# Patient Record
Sex: MALE | Race: BLACK OR AFRICAN AMERICAN | NOT HISPANIC OR LATINO | URBAN - METROPOLITAN AREA
[De-identification: names, ages, dates, MRNs, and addresses within clinical notes are randomized per-mention and may not be internally consistent; named-entity substitution may affect disease eponyms.]

---

## 2017-06-06 ENCOUNTER — EMERGENCY (EMERGENCY)
Facility: HOSPITAL | Age: 32
LOS: 1 days | Discharge: HOME | End: 2017-06-06
Admitting: EMERGENCY MEDICINE
Payer: COMMERCIAL

## 2017-06-06 VITALS
TEMPERATURE: 98 F | RESPIRATION RATE: 16 BRPM | OXYGEN SATURATION: 98 % | HEART RATE: 66 BPM | SYSTOLIC BLOOD PRESSURE: 125 MMHG | DIASTOLIC BLOOD PRESSURE: 84 MMHG

## 2017-06-06 PROCEDURE — 99284 EMERGENCY DEPT VISIT MOD MDM: CPT

## 2017-06-06 RX ORDER — KETOROLAC TROMETHAMINE 30 MG/ML
30 SYRINGE (ML) INJECTION ONCE
Qty: 0 | Refills: 0 | Status: DISCONTINUED | OUTPATIENT
Start: 2017-06-06 | End: 2017-06-06

## 2017-06-06 RX ADMIN — Medication 30 MILLIGRAM(S): at 22:45
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
## 2017-06-06 NOTE — ED PROVIDER NOTE - ENMT, MLM
Airway patent, Nasal mucosa clear. Mouth with normal mucosa. Throat has no vesicles, no oropharyngeal exudates and uvula is midline. Ears TMs astorga b/l, no gross blood noted.
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
## 2019-03-04 PROBLEM — Z00.00 ENCOUNTER FOR PREVENTIVE HEALTH EXAMINATION: Status: ACTIVE | Noted: 2019-03-04

## 2019-03-14 ENCOUNTER — APPOINTMENT (OUTPATIENT)
Dept: SPINE | Facility: CLINIC | Age: 34
End: 2019-03-14
Payer: COMMERCIAL

## 2019-03-14 VITALS
SYSTOLIC BLOOD PRESSURE: 120 MMHG | BODY MASS INDEX: 26.51 KG/M2 | WEIGHT: 200 LBS | HEIGHT: 73 IN | DIASTOLIC BLOOD PRESSURE: 70 MMHG

## 2019-03-14 DIAGNOSIS — M54.2 CERVICALGIA: ICD-10-CM
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
## 2019-03-14 DIAGNOSIS — M54.5 LOW BACK PAIN: ICD-10-CM

## 2019-03-14 DIAGNOSIS — Z78.9 OTHER SPECIFIED HEALTH STATUS: ICD-10-CM

## 2019-03-14 PROCEDURE — 99203 OFFICE O/P NEW LOW 30 MIN: CPT

## 2019-03-14 RX ORDER — IBUPROFEN 200 MG/1
TABLET, COATED ORAL
Refills: 0 | Status: ACTIVE | COMMUNITY

## 2019-03-14 RX ORDER — MULTIVITAMIN
TABLET ORAL
Refills: 0 | Status: ACTIVE | COMMUNITY

## 2019-03-14 RX ORDER — IBUPROFEN 800 MG/1
TABLET, FILM COATED ORAL
Refills: 0 | Status: ACTIVE | COMMUNITY
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
## 2019-03-14 NOTE — PHYSICAL EXAM
[Person] : oriented to person [Place] : oriented to place [Time] : oriented to time [Short Term Intact] : short term memory intact [Remote Intact] : remote memory intact [Span Intact] : the attention span was normal [Concentration Intact] : normal concentrating ability [Fluency] : fluency intact [Comprehension] : comprehension intact [Current Events] : adequate knowledge of current events [Past History] : adequate knowledge of personal past history [Vocabulary] : adequate range of vocabulary [Cranial Nerves Optic (II)] : visual acuity intact bilaterally,  pupils equal round and reactive to light [Cranial Nerves Oculomotor (III)] : extraocular motion intact [Cranial Nerves Trigeminal (V)] : facial sensation intact symmetrically [Cranial Nerves Facial (VII)] : face symmetrical [Cranial Nerves Vestibulocochlear (VIII)] : hearing was intact bilaterally [Cranial Nerves Glossopharyngeal (IX)] : tongue and palate midline [Cranial Nerves Accessory (XI - Cranial And Spinal)] : head turning and shoulder shrug symmetric [Cranial Nerves Hypoglossal (XII)] : there was no tongue deviation with protrusion [Motor Tone] : muscle tone was normal in all four extremities [Motor Strength] : muscle strength was normal in all four extremities [No Muscle Atrophy] : normal bulk in all four extremities [Sensation Tactile Decrease] : light touch was intact [Abnormal Walk] : normal gait [Balance] : balance was intact [Past-pointing] : there was no past-pointing [Tremor] : no tremor present [2+] : Ankle jerk left 2+ [Plantar Reflex Right Only] : normal on the right [Plantar Reflex Left Only] : normal on the left [Gibson] : Gibson's sign was not demonstrated [L'Hermitte's] : neck flexion did not produce tingling down the spine/arms [Spurling's - Opposite Side] : Negative Spurling's on opposite side [Spurling's Same Side] : Negative Spurling's on same side [No Tenderness to Palpation] : no spine tenderness on palpation [Straight-Leg Raise Test - Left] : straight leg raise of the left leg was negative [Straight-Leg Raise Test - Right] : straight leg raise  of the right leg was negative

## 2024-10-01 ENCOUNTER — APPOINTMENT (OUTPATIENT)
Dept: INTERNAL MEDICINE | Facility: CLINIC | Age: 39
End: 2024-10-01

## 2025-04-23 ENCOUNTER — NON-APPOINTMENT (OUTPATIENT)
Age: 40
End: 2025-04-23

## 2025-04-29 ENCOUNTER — TRANSCRIPTION ENCOUNTER (OUTPATIENT)
Age: 40
End: 2025-04-29

## 2025-04-29 ENCOUNTER — INPATIENT (INPATIENT)
Facility: HOSPITAL | Age: 40
LOS: 0 days | Discharge: HOME | End: 2025-04-30
Attending: UROLOGY | Admitting: UROLOGY
Payer: COMMERCIAL

## 2025-04-29 VITALS
HEART RATE: 85 BPM | OXYGEN SATURATION: 96 % | TEMPERATURE: 98 F | SYSTOLIC BLOOD PRESSURE: 130 MMHG | HEIGHT: 73 IN | WEIGHT: 229.94 LBS | RESPIRATION RATE: 19 BRPM | DIASTOLIC BLOOD PRESSURE: 80 MMHG

## 2025-04-29 LAB
HCT VFR BLD CALC: 45.2 % — SIGNIFICANT CHANGE UP (ref 39–50)
HGB BLD-MCNC: 15.2 G/DL — SIGNIFICANT CHANGE UP (ref 13–17)
MCHC RBC-ENTMCNC: 28.7 PG — SIGNIFICANT CHANGE UP (ref 27–34)
MCHC RBC-ENTMCNC: 33.6 G/DL — SIGNIFICANT CHANGE UP (ref 32–36)
MCV RBC AUTO: 85.3 FL — SIGNIFICANT CHANGE UP (ref 80–100)
NRBC BLD AUTO-RTO: 0 /100 WBCS — SIGNIFICANT CHANGE UP (ref 0–0)
PLATELET # BLD AUTO: 226 K/UL — SIGNIFICANT CHANGE UP (ref 150–400)
RBC # BLD: 5.3 M/UL — SIGNIFICANT CHANGE UP (ref 4.2–5.8)
RBC # FLD: 13.9 % — SIGNIFICANT CHANGE UP (ref 10.3–14.5)
WBC # BLD: 10.35 K/UL — SIGNIFICANT CHANGE UP (ref 3.8–10.5)
WBC # FLD AUTO: 10.35 K/UL — SIGNIFICANT CHANGE UP (ref 3.8–10.5)

## 2025-04-29 PROCEDURE — 55110 EXPLORE SCROTUM: CPT | Mod: LT

## 2025-04-29 PROCEDURE — 99285 EMERGENCY DEPT VISIT HI MDM: CPT

## 2025-04-29 PROCEDURE — 76870 US EXAM SCROTUM: CPT | Mod: 26

## 2025-04-29 RX ORDER — HEPARIN SODIUM 1000 [USP'U]/ML
5000 INJECTION INTRAVENOUS; SUBCUTANEOUS EVERY 8 HOURS
Refills: 0 | Status: DISCONTINUED | OUTPATIENT
Start: 2025-04-29 | End: 2025-04-30

## 2025-04-29 RX ORDER — SODIUM CHLORIDE 9 G/1000ML
1000 INJECTION, SOLUTION INTRAVENOUS
Refills: 0 | Status: DISCONTINUED | OUTPATIENT
Start: 2025-04-29 | End: 2025-04-30

## 2025-04-29 NOTE — ED PROVIDER NOTE - CLINICAL SUMMARY MEDICAL DECISION MAKING FREE TEXT BOX
Patient is a 40-year-old male presenting to the emergency department today for testicular pain.  Tender to palpation over the left testicle.  Concern today for torsion.  Ultrasound was taken showing concern for torsion/detorsion.  Urinalysis was ordered for the patient.  Urology was consulted for evaluation.    At this time, the patient's consult by urology has not been completed.  For this reason, they will be signed out to the oncoming physician.  Please see the oncoming physician's addendum's, notes, and progress notes for any change in this patient's management or care. Patient is a 40-year-old male presenting to the emergency department today for testicular pain.  Tender to palpation over the left testicle.  Concern today for torsion.  Ultrasound was taken showing concern for torsion/detorsion.  Urinalysis was ordered for the patient.  Urology was consulted for evaluation.    Urology did evaluate the patient.  Per the recommendation, the patient should be admitted under their service as he will require evaluation in the operating room tonight for testicular torsion.  Patient expresses understanding and is amenable to this plan.  I offered the patient medication for pain and he declined at this time.

## 2025-04-29 NOTE — ED ADULT NURSE NOTE - CAS TRG GEN SKIN COLOR
Normal for race

## 2025-04-29 NOTE — H&P ADULT - ASSESSMENT
40M w/ no pertinent PMHx presented w/ Left testicular pain, nausea, and vomiting.  US revealed decreased L testicular vascularity compared to R. Torsion-detorsion on differential.    Plan as discussed with Dr. Medina:  - admit  - pre-op labs, EKG, IVF, NPO  - consent and book for OR for scrotal exploration, possible left orchiectomy, right orchiopexy

## 2025-04-29 NOTE — ED PROVIDER NOTE - OBJECTIVE STATEMENT
This patient is a 40-year-old male presenting to the emergency department today with testicular pain.  He has no relevant past medical history.  States that 2 days ago he started experiencing pain in his left testicle after abruptly jumping.  States the pain continued to worsen through the day.  For this reason he came to the emergency department for further evaluation.  He has no chest pain or shortness of breath.  No dysuria.  No blood at the urethral meatus.  No other complaints at this time

## 2025-04-29 NOTE — ED PROVIDER NOTE - PHYSICAL EXAMINATION
GENERAL: The patient appears well and is in no apparent distress.    EYES: Pupils equal and reactive.  Extraocular eye movements are intact.    ENT: Head is atraumatic.  Posterior oropharynx is unremarkable.      ABDOMEN: Abdomen is soft, nondistended, and non-peritoneal.  Bowel sounds are appreciated in all 4 quadrants.  The patient has no focal areas of tenderness.    Genitourinary: Right testicle soft and normal lie.  Left testicle noted to be elevated.  Tender to palpation.  Penile shaft is normal.  No evidence of ulcerations.  Penile head shows no discharge.  No blood at the urethral meatus.    SKIN: Skin is intact without evidence of significant lacerations or sores.    MUSCULOSKELETAL: Patient has good range of motion of all extremities.  The patient has good distal cap refill.  The patient has palpable distal pulses.  No obvious edema is noted.    NEUROLOGIC: Cranial nerves II through XII are grossly within normal limits.  Sensory and motor examination is unremarkable.    PSYCHIATRIC: Patient is awake, alert, and oriented ×4.

## 2025-04-29 NOTE — ED ADULT NURSE NOTE - TEMPLATE
INR 2.13 today   Is on 6 mg daily   Noted last couma clinic note in EPIC 4/27 was getting 45 mg weekly dose using 5 mg/7.5 mg alternating.     New order  Cont 6 mg po q day and recheck inr Monday 5.7    Symptoms
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
## 2025-04-29 NOTE — ED PROVIDER NOTE - NS ED ROS FT
CONSTITUTIONAL: No weight loss, fever, chills, weakness or fatigue.    HEENT:    Eyes: No visual loss, blurred vision, double vision or yellow sclerae.  Ears, Nose, Throat: No hearing loss, sneezing, congestion, runny nose or sore throat.    CARDIOVASCULAR: No chest pain, chest pressure or chest discomfort. No palpitations or edema.    RESPIRATORY: No shortness of breath, cough or sputum.    GASTROINTESTINAL: No anorexia, nausea, vomiting or diarrhea. No abdominal pain or blood.    SKIN: No rash or itching.    GENITOURINARY: Positive for testicular pain    NEUROLOGICAL: No headache, dizziness, syncope, paralysis, ataxia, numbness or tingling in the extremities. No change in bowel or bladder control.    MUSCULOSKELETAL: No muscle, back pain, joint pain or stiffness.

## 2025-04-29 NOTE — H&P ADULT - NSHPPHYSICALEXAM_GEN_ALL_CORE
T(C): 36.9 (04-29-25 @ 21:22), Max: 36.9 (04-29-25 @ 21:22)  HR: 85 (04-29-25 @ 21:22) (85 - 85)  BP: 130/80 (04-29-25 @ 21:22) (130/80 - 130/80)  RR: 19 (04-29-25 @ 21:22) (19 - 19)  SpO2: 96% (04-29-25 @ 21:22) (96% - 96%)    CONSTITUTIONAL: Well groomed, no apparent distress    EYES:  symmetric, EOMI, No conjunctival or scleral injection, non-icteric    ENMT: Oral mucosa with moist membranes. NCAT    RESPIRATORY: No respiratory distress, no use of accessory muscles    CARDIOVASCULAR: RRR    GASTROINTESTINAL: Soft, non tender, non distended, no rebound, no guarding    : TTP of Left testicle, nontender R testicle    MUSCULOSKELETAL: Normal gait and station; no digital clubbing or cyanosis    SKIN: No rashes or ulcers noted    NEUROLOGIC:  A+O x 3    PSYCHIATRIC: Appropriate insight/judgment;, mood and affect appropriate

## 2025-04-29 NOTE — H&P ADULT - NSHPLABSRESULTS_GEN_ALL_CORE
< from: US Testicles (04.29.25 @ 22:36) >      ACC: [id number] EXAM:  US SCROTUM AND CONTENTS   ORDERED BY: TIM SCHWARTZ     PROCEDURE DATE:  04/29/2025          INTERPRETATION:  CLINICAL INFORMATION: Left testicular pain.    COMPARISON: None available.    TECHNIQUE: Testicular ultrasound utilizing color and spectral Doppler.    FINDINGS:    RIGHT:  Right testis: 4.7 cm x 2.1 cm x 3.2 cm. Normal echogenicity and   echotexture with no masses or areas of architectural distortion. Normal   arterial and venous blood flow pattern.  Right epididymis: Within normal limits.  Right hydrocele: None.  Right varicocele: None.    LEFT:  Left testis: 3.8 cm x 2.0 cm x 2.6 cm. Normal echogenicity and   echotexture with no masses or areas of architectural distortion. Decrease   vascularity compared to contralateral side.  Left epididymis: Within normal limits.  Left hydrocele: Trace.  Left varicocele: None.    IMPRESSION:    Decreased left testicular vascularity compared to the right side.   Possibility of torsion-detorsion considered in the differential. Urology   consultation and follow-up imaging is suggested.    Trace ascites.    These critical results were discussed via telephone at 4/29/2025 10:51 PM   by Dr. Trivedi of radiology with Dr. Schwartz.    --- End of Report ---            MARILYN WEBBER; Attending Radiologist  This document has been electronically signed. Apr 29 2025 10:52PM    < end of copied text >

## 2025-04-29 NOTE — H&P ADULT - HISTORY OF PRESENT ILLNESS
40M w/ no PMHx presented to ED w/ testicular pain since yesterday. States he stood up quickly and felt a squeezing sensation followed by weakness in legs. Attempted to alleviate pain with ice and OTC pain medications without help. Pain radiates to back and into L groin area. Reports associated nausea and vomiting. Some subjective fever. Patient has been sick recently with a cold for which he was taking Augmentin and pseudophed-Bromphen since 4/23. Denies CP, calf pain, or swelling.

## 2025-04-30 ENCOUNTER — TRANSCRIPTION ENCOUNTER (OUTPATIENT)
Age: 40
End: 2025-04-30

## 2025-04-30 ENCOUNTER — RESULT REVIEW (OUTPATIENT)
Age: 40
End: 2025-04-30

## 2025-04-30 VITALS
TEMPERATURE: 98 F | SYSTOLIC BLOOD PRESSURE: 105 MMHG | DIASTOLIC BLOOD PRESSURE: 64 MMHG | OXYGEN SATURATION: 95 % | RESPIRATION RATE: 19 BRPM | HEART RATE: 87 BPM

## 2025-04-30 LAB
ANION GAP SERPL CALC-SCNC: 1 MMOL/L — LOW (ref 5–17)
ANION GAP SERPL CALC-SCNC: 10 MMOL/L — SIGNIFICANT CHANGE UP (ref 5–17)
APPEARANCE UR: CLEAR — SIGNIFICANT CHANGE UP
APTT BLD: 36.6 SEC — SIGNIFICANT CHANGE UP (ref 26.1–36.8)
BILIRUB UR-MCNC: NEGATIVE — SIGNIFICANT CHANGE UP
BUN SERPL-MCNC: 14 MG/DL — SIGNIFICANT CHANGE UP (ref 7–23)
BUN SERPL-MCNC: 14 MG/DL — SIGNIFICANT CHANGE UP (ref 7–23)
CALCIUM SERPL-MCNC: 9.1 MG/DL — SIGNIFICANT CHANGE UP (ref 8.5–10.1)
CALCIUM SERPL-MCNC: 9.7 MG/DL — SIGNIFICANT CHANGE UP (ref 8.5–10.1)
CHLORIDE SERPL-SCNC: 105 MMOL/L — SIGNIFICANT CHANGE UP (ref 96–108)
CHLORIDE SERPL-SCNC: 107 MMOL/L — SIGNIFICANT CHANGE UP (ref 96–108)
CO2 SERPL-SCNC: 20 MMOL/L — LOW (ref 22–31)
CO2 SERPL-SCNC: 32 MMOL/L — HIGH (ref 22–31)
COLOR SPEC: YELLOW — SIGNIFICANT CHANGE UP
CREAT SERPL-MCNC: 1.43 MG/DL — HIGH (ref 0.5–1.3)
CREAT SERPL-MCNC: 1.45 MG/DL — HIGH (ref 0.5–1.3)
DIFF PNL FLD: NEGATIVE — SIGNIFICANT CHANGE UP
EGFR: 63 ML/MIN/1.73M2 — SIGNIFICANT CHANGE UP
EGFR: 63 ML/MIN/1.73M2 — SIGNIFICANT CHANGE UP
EGFR: 64 ML/MIN/1.73M2 — SIGNIFICANT CHANGE UP
EGFR: 64 ML/MIN/1.73M2 — SIGNIFICANT CHANGE UP
GLUCOSE SERPL-MCNC: 213 MG/DL — HIGH (ref 70–99)
GLUCOSE SERPL-MCNC: 96 MG/DL — SIGNIFICANT CHANGE UP (ref 70–99)
GLUCOSE UR QL: NEGATIVE MG/DL — SIGNIFICANT CHANGE UP
HCT VFR BLD CALC: 41.6 % — SIGNIFICANT CHANGE UP (ref 39–50)
HGB BLD-MCNC: 14.1 G/DL — SIGNIFICANT CHANGE UP (ref 13–17)
INR BLD: 1.09 RATIO — SIGNIFICANT CHANGE UP (ref 0.85–1.16)
KETONES UR-MCNC: 15 MG/DL
LEUKOCYTE ESTERASE UR-ACNC: ABNORMAL
MCHC RBC-ENTMCNC: 28.7 PG — SIGNIFICANT CHANGE UP (ref 27–34)
MCHC RBC-ENTMCNC: 33.9 G/DL — SIGNIFICANT CHANGE UP (ref 32–36)
MCV RBC AUTO: 84.7 FL — SIGNIFICANT CHANGE UP (ref 80–100)
NITRITE UR-MCNC: NEGATIVE — SIGNIFICANT CHANGE UP
NRBC BLD AUTO-RTO: 0 /100 WBCS — SIGNIFICANT CHANGE UP (ref 0–0)
PH UR: 6.5 — SIGNIFICANT CHANGE UP (ref 5–8)
PLATELET # BLD AUTO: 223 K/UL — SIGNIFICANT CHANGE UP (ref 150–400)
POTASSIUM SERPL-MCNC: 3.9 MMOL/L — SIGNIFICANT CHANGE UP (ref 3.5–5.3)
POTASSIUM SERPL-MCNC: 4 MMOL/L — SIGNIFICANT CHANGE UP (ref 3.5–5.3)
POTASSIUM SERPL-SCNC: 3.9 MMOL/L — SIGNIFICANT CHANGE UP (ref 3.5–5.3)
POTASSIUM SERPL-SCNC: 4 MMOL/L — SIGNIFICANT CHANGE UP (ref 3.5–5.3)
PROT UR-MCNC: 30 MG/DL
PROTHROM AB SERPL-ACNC: 12.6 SEC — SIGNIFICANT CHANGE UP (ref 9.9–13.4)
RBC # BLD: 4.91 M/UL — SIGNIFICANT CHANGE UP (ref 4.2–5.8)
RBC # FLD: 13.9 % — SIGNIFICANT CHANGE UP (ref 10.3–14.5)
SODIUM SERPL-SCNC: 137 MMOL/L — SIGNIFICANT CHANGE UP (ref 135–145)
SODIUM SERPL-SCNC: 138 MMOL/L — SIGNIFICANT CHANGE UP (ref 135–145)
SP GR SPEC: >1.03 — HIGH (ref 1–1.03)
UROBILINOGEN FLD QL: 1 MG/DL — SIGNIFICANT CHANGE UP (ref 0.2–1)
WBC # BLD: 11.87 K/UL — HIGH (ref 3.8–10.5)
WBC # FLD AUTO: 11.87 K/UL — HIGH (ref 3.8–10.5)

## 2025-04-30 PROCEDURE — 93010 ELECTROCARDIOGRAM REPORT: CPT

## 2025-04-30 PROCEDURE — ZZZZZ: CPT

## 2025-04-30 RX ORDER — IBUPROFEN 200 MG
600 TABLET ORAL ONCE
Refills: 0 | Status: COMPLETED | OUTPATIENT
Start: 2025-04-30 | End: 2025-04-30

## 2025-04-30 RX ORDER — ACETAMINOPHEN 500 MG/5ML
1000 LIQUID (ML) ORAL EVERY 6 HOURS
Refills: 0 | Status: DISCONTINUED | OUTPATIENT
Start: 2025-04-30 | End: 2025-04-30

## 2025-04-30 RX ORDER — SODIUM CHLORIDE 9 G/1000ML
1000 INJECTION, SOLUTION INTRAVENOUS
Refills: 0 | Status: DISCONTINUED | OUTPATIENT
Start: 2025-04-30 | End: 2025-04-30

## 2025-04-30 RX ORDER — HYDROMORPHONE/SOD CHLOR,ISO/PF 2 MG/10 ML
0.5 SYRINGE (ML) INJECTION
Refills: 0 | Status: DISCONTINUED | OUTPATIENT
Start: 2025-04-30 | End: 2025-04-30

## 2025-04-30 RX ORDER — ONDANSETRON HCL/PF 4 MG/2 ML
4 VIAL (ML) INJECTION ONCE
Refills: 0 | Status: DISCONTINUED | OUTPATIENT
Start: 2025-04-30 | End: 2025-04-30

## 2025-04-30 RX ORDER — HEPARIN SODIUM 1000 [USP'U]/ML
5000 INJECTION INTRAVENOUS; SUBCUTANEOUS EVERY 8 HOURS
Refills: 0 | Status: DISCONTINUED | OUTPATIENT
Start: 2025-04-30 | End: 2025-04-30

## 2025-04-30 RX ADMIN — HEPARIN SODIUM 5000 UNIT(S): 1000 INJECTION INTRAVENOUS; SUBCUTANEOUS at 13:47

## 2025-04-30 RX ADMIN — Medication 145 MILLILITER(S): at 04:22

## 2025-04-30 RX ADMIN — HEPARIN SODIUM 5000 UNIT(S): 1000 INJECTION INTRAVENOUS; SUBCUTANEOUS at 06:00

## 2025-04-30 RX ADMIN — Medication 0.5 MILLIGRAM(S): at 03:37

## 2025-04-30 RX ADMIN — SODIUM CHLORIDE 125 MILLILITER(S): 9 INJECTION, SOLUTION INTRAVENOUS at 03:06

## 2025-04-30 RX ADMIN — Medication 600 MILLIGRAM(S): at 04:22

## 2025-04-30 RX ADMIN — Medication 1000 MILLIGRAM(S): at 12:43

## 2025-04-30 RX ADMIN — Medication 0.5 MILLIGRAM(S): at 03:25

## 2025-04-30 RX ADMIN — Medication 1000 MILLIGRAM(S): at 07:00

## 2025-04-30 RX ADMIN — Medication 600 MILLIGRAM(S): at 05:22

## 2025-04-30 RX ADMIN — Medication 400 MILLIGRAM(S): at 11:40

## 2025-04-30 RX ADMIN — Medication 400 MILLIGRAM(S): at 06:00

## 2025-04-30 NOTE — DISCHARGE NOTE PROVIDER - CARE PROVIDER_API CALL
Vin Medina  Urology  [address]  [place] [address]  Phone: [phone]  Fax: [phone]  Follow Up Time: 2 weeks

## 2025-04-30 NOTE — DISCHARGE NOTE NURSING/CASE MANAGEMENT/SOCIAL WORK - NSDCPEFALRISK_GEN_ALL_CORE
For information on Fall & Injury Prevention, visit: [email or web address] OR  [email or web address] OR  https://www.cdc.gov/steadi/patient.html

## 2025-04-30 NOTE — DISCHARGE NOTE NURSING/CASE MANAGEMENT/SOCIAL WORK - FINANCIAL ASSISTANCE
[facility] provides services at a reduced cost to those who are determined to be eligible through [facility]’s financial assistance program. Information regarding [facility]’s financial assistance program can be found by going to [email or web address] or by calling [phone].

## 2025-04-30 NOTE — DISCHARGE NOTE PROVIDER - NPI NUMBER (FOR SYSADMIN USE ONLY) :
Future Appointments  Date Time Provider Jackelin Ailin   4/3/2017 2:00 PM Ermias Stewart MD [facility] [place]     LOV: 2/13/17 [[id number]]

## 2025-04-30 NOTE — DISCHARGE NOTE PROVIDER - DISCHARGE DIET
Patient feeling much better from ED visit  Relayed Ucx results to patient which were negative  As soon as patient started antibiotics, bleeding and fever stopped.     Now with light bleeding intermittently. But pain much improved and bleeding light.     Yesterday was having lower back pain. But feels okay today.  
Regular Diet - No restrictions

## 2025-04-30 NOTE — DISCHARGE NOTE PROVIDER - NSDCFUADDINST_GEN_ALL_CORE_FT
Drink plenty of fluids to prevent constipation and fruit juices that are high in vitamin C. Rest as needed.     Please take tylenol 1000mg and motrin 600mg every 6 hours for pain. Alternate between the two medications.  (Ex: ibuprofen at 9am, 3pm and 9pm and tylenol at 6am, 12pm and 6pm).     In 48 hours, you may remove dressings, shower and pat dry abdomen. Do not submerge wounds underwater.  Please do not scrub or rub incisions. Do not use lotion or powder on incisions.     Continue to wear scrotal support for comfort and healing with padding until follow up in office.    Do not lift anything heavier than 10 pounds.  No heavy lifting or straining. Otherwise, you may return to your usual level of physical activity.     Return to your usual diet.    NOTIFY YOUR SURGEON IF YOU HAVE: any bleeding that does not stop, any pus draining from your wound(s), any fever (over 100.4 F) persistent nausea/vomiting, or if your pain is not controlled on your discharge pain medications, unable to urinate.    Please follow-up with your surgeon, within 2 weeks following discharge-call to schedule an appointment.

## 2025-04-30 NOTE — PATIENT PROFILE ADULT - FALL HARM RISK - HARM RISK INTERVENTIONS

## 2025-04-30 NOTE — PROGRESS NOTE ADULT - ASSESSMENT
A/P: 40y Male s/p scrotal exploration    DVT prophylaxis/OOB  Incentive spirometry  Strict I&O's  Analgesia and antiemetics as needed  Diet - Regular  Monitor urine output  10AM labs  F/u outpatient urology

## 2025-04-30 NOTE — DISCHARGE NOTE PROVIDER - NSDCCPCAREPLAN_GEN_ALL_CORE_FT
PRINCIPAL DISCHARGE DIAGNOSIS  Diagnosis: Torsion of appendix epididymis  Assessment and Plan of Treatment:

## 2025-04-30 NOTE — DISCHARGE NOTE NURSING/CASE MANAGEMENT/SOCIAL WORK - PATIENT PORTAL LINK FT
You can access the FollowMyHealth Patient Portal offered by [facility] by registering at the following website: [email or web address]. By joining [employer]’s FollowMyHealth portal, you will also be able to view your health information using other applications (apps) compatible with our system.

## 2025-04-30 NOTE — BRIEF OPERATIVE NOTE - NSICDXBRIEFPROCEDURE_GEN_ALL_CORE_FT
PROCEDURES:  Scrotal exploration 30-Apr-2025 02:53:18  Vin Medina  Reduction of torsion of testis 30-Apr-2025 02:55:01  Vin Medina

## 2025-04-30 NOTE — DISCHARGE NOTE PROVIDER - HOSPITAL COURSE
40-year-old male w/ no pertinent PMHx presented with testicular pain. US performed showed decreased blood flow on Left side concern for torsion-detorsion. Patient was taken to the operating room and found to have torsion of epididymal appendage which was removed and orchiopexy of L testicle performed. Post-operative course was uncomplicated. Patient tolerating regular diet, ambulating independently, pain well controlled, and stable for discharge home.

## 2025-04-30 NOTE — PROGRESS NOTE ADULT - SUBJECTIVE AND OBJECTIVE BOX
Post op Check    Pt seen and examined without complaints. Pain is controlled. States feels pressure on lower abdomen, no urination. Bladder scan 240cc. Denies SOB/CP/N/V.     Vital Signs Last 24 Hrs  T(C): 36.7 (30 Apr 2025 06:45), Max: 36.9 (29 Apr 2025 21:22)  T(F): 98 (30 Apr 2025 06:45), Max: 98.5 (29 Apr 2025 21:22)  HR: 73 (30 Apr 2025 06:45) (69 - 883)  BP: 111/68 (30 Apr 2025 06:45) (109/71 - 130/80)  BP(mean): --  RR: 18 (30 Apr 2025 06:45) (14 - 19)  SpO2: 96% (30 Apr 2025 06:45) (92% - 98%)    Parameters below as of 30 Apr 2025 06:45  Patient On (Oxygen Delivery Method): room air        I&O's Summary    29 Apr 2025 07:01  -  30 Apr 2025 07:00  --------------------------------------------------------  IN: 610 mL / OUT: 0 mL / NET: 610 mL        Physical Exam  Gen: NAD, A&Ox3  Pulm: No respiratory distress, no subcostal retractions  CV: RRR, no JVD  Abd: Soft, NT, ND  : nonpalpable bladder, bladder scan 240cc.                           15.2   10.35 )-----------( 226      ( 29 Apr 2025 23:30 )             45.2       04-29    138  |  105  |  14  ----------------------------<  96  4.0   |  32[H]  |  1.45[H]    Ca    9.7      29 Apr 2025 23:30

## 2025-04-30 NOTE — DISCHARGE NOTE PROVIDER - NSDCMRMEDTOKEN_GEN_ALL_CORE_FT
acetaminophen-oxyCODONE 325 mg-5 mg oral tablet: 1 tab(s) orally every 6 hours, As Needed severe pain MDD:4  clindamycin 300 mg oral capsule: 1 cap(s) orally 3 times a day x 7 days

## 2025-05-01 ENCOUNTER — NON-APPOINTMENT (OUTPATIENT)
Age: 40
End: 2025-05-01

## 2025-05-01 LAB — SURGICAL PATHOLOGY STUDY: SIGNIFICANT CHANGE UP

## 2025-05-06 DIAGNOSIS — N44.04 TORSION OF APPENDIX EPIDIDYMIS: ICD-10-CM

## 2025-05-06 DIAGNOSIS — Z91.018 ALLERGY TO OTHER FOODS: ICD-10-CM

## 2025-05-07 ENCOUNTER — APPOINTMENT (OUTPATIENT)
Dept: UROLOGY | Facility: CLINIC | Age: 40
End: 2025-05-07
Payer: COMMERCIAL

## 2025-05-07 ENCOUNTER — TRANSCRIPTION ENCOUNTER (OUTPATIENT)
Age: 40
End: 2025-05-07

## 2025-05-07 ENCOUNTER — NON-APPOINTMENT (OUTPATIENT)
Age: 40
End: 2025-05-07

## 2025-05-07 VITALS
TEMPERATURE: 97.3 F | BODY MASS INDEX: 32.6 KG/M2 | WEIGHT: 246 LBS | OXYGEN SATURATION: 97 % | HEIGHT: 73 IN | HEART RATE: 81 BPM | DIASTOLIC BLOOD PRESSURE: 79 MMHG | SYSTOLIC BLOOD PRESSURE: 124 MMHG

## 2025-05-07 DIAGNOSIS — Z80.42 FAMILY HISTORY OF MALIGNANT NEOPLASM OF PROSTATE: ICD-10-CM

## 2025-05-07 DIAGNOSIS — N50.3 CYST OF EPIDIDYMIS: ICD-10-CM

## 2025-05-07 PROCEDURE — 99213 OFFICE O/P EST LOW 20 MIN: CPT | Mod: 24

## 2025-05-15 ENCOUNTER — NON-APPOINTMENT (OUTPATIENT)
Age: 40
End: 2025-05-15

## 2025-05-20 ENCOUNTER — APPOINTMENT (OUTPATIENT)
Dept: UROLOGY | Facility: CLINIC | Age: 40
End: 2025-05-20
Payer: COMMERCIAL

## 2025-05-20 VITALS
BODY MASS INDEX: 30.48 KG/M2 | SYSTOLIC BLOOD PRESSURE: 118 MMHG | OXYGEN SATURATION: 95 % | TEMPERATURE: 97.7 F | DIASTOLIC BLOOD PRESSURE: 71 MMHG | HEART RATE: 88 BPM | WEIGHT: 230 LBS | HEIGHT: 73 IN

## 2025-05-20 DIAGNOSIS — N50.3 CYST OF EPIDIDYMIS: ICD-10-CM

## 2025-05-20 PROCEDURE — 99212 OFFICE O/P EST SF 10 MIN: CPT | Mod: 24

## (undated) DEVICE — ELCTR GROUNDING PAD ADULT COVIDIEN

## (undated) DEVICE — GLV 7.5 PROTEXIS (WHITE)

## (undated) DEVICE — FRA-ESU BOVIE FORCE TRIAD T6D04777DX: Type: DURABLE MEDICAL EQUIPMENT

## (undated) DEVICE — Device

## (undated) DEVICE — ELCTR PLASMA BUTTON OVAL 24FR 12-30 DEG

## (undated) DEVICE — PREP BETADINE KIT

## (undated) DEVICE — SEAL BIOPSY PORT ADJUSTABLE UP TO 9F

## (undated) DEVICE — PACK CYSTO

## (undated) DEVICE — ELCTR PLASMA LOOP LARGE 24FR 12-16 DEG

## (undated) DEVICE — ELCTR PLASMA NEEDLE ANGLED 24FR 12-30 DEG

## (undated) DEVICE — SUT VICRYL 4-0 18" PS-2 UNDYED